# Patient Record
Sex: MALE | Race: WHITE | Employment: FULL TIME | URBAN - METROPOLITAN AREA
[De-identification: names, ages, dates, MRNs, and addresses within clinical notes are randomized per-mention and may not be internally consistent; named-entity substitution may affect disease eponyms.]

---

## 2017-01-24 ENCOUNTER — PATIENT MESSAGE (OUTPATIENT)
Dept: FAMILY MEDICINE CLINIC | Facility: CLINIC | Age: 47
End: 2017-01-24

## 2017-01-24 NOTE — TELEPHONE ENCOUNTER
From: Dom Mann. To: Nikki Frazier DO  Sent: 2017 7:57 AM CST  Subject: Other    Good morning Dr Chito Forde, My son, Melina Lao ( [birth year]/25[birth year]02), developed a groin injury  afternoon during baseball practice.  The area is swollen with some minor b

## 2017-01-26 ENCOUNTER — MED REC SCAN ONLY (OUTPATIENT)
Dept: FAMILY MEDICINE CLINIC | Facility: CLINIC | Age: 47
End: 2017-01-26

## 2017-04-06 DIAGNOSIS — E11.9 TYPE 2 DIABETES MELLITUS WITHOUT COMPLICATION, WITH LONG-TERM CURRENT USE OF INSULIN (HCC): Primary | ICD-10-CM

## 2017-04-06 DIAGNOSIS — Z79.4 TYPE 2 DIABETES MELLITUS WITHOUT COMPLICATION, WITH LONG-TERM CURRENT USE OF INSULIN (HCC): Primary | ICD-10-CM

## 2017-05-18 ENCOUNTER — MED REC SCAN ONLY (OUTPATIENT)
Dept: FAMILY MEDICINE CLINIC | Facility: CLINIC | Age: 47
End: 2017-05-18

## 2017-07-19 ENCOUNTER — MED REC SCAN ONLY (OUTPATIENT)
Dept: FAMILY MEDICINE CLINIC | Facility: CLINIC | Age: 47
End: 2017-07-19

## 2017-09-04 ENCOUNTER — OFFICE VISIT (OUTPATIENT)
Dept: FAMILY MEDICINE CLINIC | Facility: CLINIC | Age: 47
End: 2017-09-04

## 2017-09-04 VITALS
HEIGHT: 69 IN | HEART RATE: 90 BPM | SYSTOLIC BLOOD PRESSURE: 124 MMHG | RESPIRATION RATE: 18 BRPM | DIASTOLIC BLOOD PRESSURE: 78 MMHG | OXYGEN SATURATION: 98 % | BODY MASS INDEX: 43.84 KG/M2 | WEIGHT: 296 LBS | TEMPERATURE: 98 F

## 2017-09-04 DIAGNOSIS — M62.838 TRAPEZIUS MUSCLE SPASM: Primary | ICD-10-CM

## 2017-09-04 DIAGNOSIS — M54.6 ACUTE RIGHT-SIDED THORACIC BACK PAIN: ICD-10-CM

## 2017-09-04 PROCEDURE — 99213 OFFICE O/P EST LOW 20 MIN: CPT | Performed by: NURSE PRACTITIONER

## 2017-09-04 RX ORDER — CYCLOBENZAPRINE HCL 10 MG
10 TABLET ORAL 3 TIMES DAILY
Qty: 30 TABLET | Refills: 1 | Status: SHIPPED | OUTPATIENT
Start: 2017-09-04 | End: 2017-09-24

## 2017-09-04 RX ORDER — PREDNISONE 20 MG/1
TABLET ORAL
Qty: 10 TABLET | Refills: 0 | Status: SHIPPED | OUTPATIENT
Start: 2017-09-04 | End: 2021-02-08 | Stop reason: ALTCHOICE

## 2017-09-04 NOTE — PROGRESS NOTES
HPI:   Deforest Dakins. is a 56year old male who is here for complaints of recurrent right upper back/shoulder pain. Pt states that this began last evening and feels sharp at times and feels muscles spasm.   Pt states that he is at his computer working 98.2 °F (36.8 °C) (Oral)   Resp 18   Ht 69\"   Wt 296 lb   SpO2 98%   BMI 43.71 kg/m²    GENERAL: well developed, well nourished,in no apparent distress  SKIN: no rashes,no suspicious lesions  NECK: supple,no adenopathy,no bruits  LUNGS: clear to auscultat

## 2017-09-04 NOTE — PATIENT INSTRUCTIONS
General Neck and Back Pain    Both neck and back pain are usually caused by injury to the muscles or ligaments of the spine. Sometimes the disks that separate each bone of the spine may cause pain by pressing on a nearby nerve.  Back and neck pain may shabana · Poor conditioning, lack of regular exercise  · Spinal disc disease or arthritis  · Stress  · Pregnancy, or illness like appendicitis, bladder or kidney infection, pelvic infections   Home care  · For neck pain: Use a comfortable pillow that supports the · You may use over-the-counter medicine to control pain, unless another pain medicine was prescribed. If you have chronic conditions like diabetes, liver or kidney disease, stomach ulcers,  gastrointestinal bleeding, or are taking blood thinner medicines. 3. Put your left hand on the right side of your head. Gently pull your head to the left. Hold for 30 to 60 seconds. Use gentle pressure to increase the stretch. Don’t force your head into position. 4. Return your head and neck to the neutral position.   5.

## 2018-01-25 NOTE — TELEPHONE ENCOUNTER
From: Lori Lucas.   To: Ezio Almodovar DO  Sent: 4/6/2017 10:35 AM CDT  Subject: Medication Renewal Request    Original authorizing provider: DO Lori Kim. would like a refill of the following medications:  BD PEN NEEDLE MI
Daily Assessment

## 2018-05-29 LAB — AMB EXT HGBA1C: 6.8 %

## 2018-10-23 ENCOUNTER — PATIENT MESSAGE (OUTPATIENT)
Dept: FAMILY MEDICINE CLINIC | Facility: CLINIC | Age: 48
End: 2018-10-23

## 2018-10-23 NOTE — TELEPHONE ENCOUNTER
From: Ying Collins. To: Lynn Montejo DO  Sent: 10/23/2018 7:40 AM CDT  Subject: Other    Good Morning Dr Flavia Saavedra,    I wanted to send you a message to let you know that I got my flu shot last night, 10/22, from the [facility] at [place] [address].  I

## 2018-10-25 ENCOUNTER — PATIENT MESSAGE (OUTPATIENT)
Dept: FAMILY MEDICINE CLINIC | Facility: CLINIC | Age: 48
End: 2018-10-25

## 2018-10-25 NOTE — TELEPHONE ENCOUNTER
From: Mitch Landau. To: Aida Mcadams DO  Sent: 10/25/2018 8:20 AM CDT  Subject: Other    Good Morning Dr Kamilla Cee,    I wanted to send you a message to let you know that Birdgett Timmons got a flu shot last night, 10/24, from the [facility] at [place] [address].

## 2018-12-19 ENCOUNTER — TELEPHONE (OUTPATIENT)
Dept: FAMILY MEDICINE CLINIC | Facility: CLINIC | Age: 48
End: 2018-12-19

## 2019-03-14 ENCOUNTER — OFFICE VISIT (OUTPATIENT)
Dept: FAMILY MEDICINE CLINIC | Facility: CLINIC | Age: 49
End: 2019-03-14
Payer: COMMERCIAL

## 2019-03-14 VITALS
TEMPERATURE: 102 F | HEIGHT: 69 IN | WEIGHT: 292 LBS | SYSTOLIC BLOOD PRESSURE: 155 MMHG | DIASTOLIC BLOOD PRESSURE: 80 MMHG | OXYGEN SATURATION: 98 % | RESPIRATION RATE: 16 BRPM | BODY MASS INDEX: 43.25 KG/M2 | HEART RATE: 93 BPM

## 2019-03-14 DIAGNOSIS — J11.1 INFLUENZA-LIKE ILLNESS: Primary | ICD-10-CM

## 2019-03-14 PROCEDURE — 99213 OFFICE O/P EST LOW 20 MIN: CPT | Performed by: NURSE PRACTITIONER

## 2019-03-14 RX ORDER — OSELTAMIVIR PHOSPHATE 75 MG/1
75 CAPSULE ORAL 2 TIMES DAILY
Qty: 10 CAPSULE | Refills: 0 | Status: SHIPPED | OUTPATIENT
Start: 2019-03-14 | End: 2019-03-19

## 2019-03-14 NOTE — PATIENT INSTRUCTIONS
The Flu (Influenza)     The virus that causes the flu spreads through the air in droplets when someone who has the flu coughs, sneezes, laughs, or talks. The flu (influenza) is an infection that affects your respiratory tract.  This tract is made up of The flu usually gets better after 7 days or so. In some cases, your healthcare provider may prescribe an antiviral medicine. This may help you get well a little sooner.  For the medicine to help, you need to take it as soon as possible (ideally within 48 ho · One of the best ways to avoid the flu is to get a flu vaccine each year. The virus that causes the flu changes from year to year. For that reason, healthcare providers recommend getting the flu vaccine each year, as soon as it's available in your area.  Bradley Orozco · Rub until the gel is gone and your hands are completely dry. Preventing the flu in healthcare settings  The flu is a special concern for people in hospitals and long-term care facilities.  To help prevent the spread of flu, many hospitals and nursing jean paul

## 2019-03-14 NOTE — PROGRESS NOTES
Patient presents with:  URI: fever,chills,congestion and sinus sx x   :    HPI:   Joy Reyes. is a 51year old male who presents for upper respiratory symptoms for  2  days. Started suddenly. Getting worse.  Feeling feverish,chills headaches, conge Past Surgical History:   Procedure Laterality Date   • MOUTH ODONTECTOMY  5/4/2013    Performed by Cuca Aguilar MD at [facility] MAIN OR   • ORAL SURGERY PROCEDURE        Family History   Problem Relation Age of Onset   • Other (acute MI [Other]) Father Phosphate (TAMIFLU) 75 MG Oral Cap 10 capsule 0     Sig: Take 1 capsule (75 mg total) by mouth 2 (two) times daily for 5 days. Imaging & Consults:  None      Rest, increase fluids,Tylenol prn.   The patient indicates understanding of these issues and

## 2019-06-13 ENCOUNTER — TELEPHONE (OUTPATIENT)
Dept: FAMILY MEDICINE CLINIC | Facility: CLINIC | Age: 49
End: 2019-06-13

## 2020-01-14 LAB
AMB EXT CHOLESTEROL, TOTAL: 117 MG/DL
AMB EXT HDL CHOLESTEROL: 33 MG/DL
AMB EXT LDL CHOLESTEROL, DIRECT: 53 MG/DL
AMB EXT TRIGLYCERIDES: 246 MG/DL
AMB EXT TSH: 3.31 MIU/ML

## 2020-05-29 LAB — AMB EXT HGBA1C: 7.4 %

## 2020-07-02 ENCOUNTER — TELEPHONE (OUTPATIENT)
Dept: FAMILY MEDICINE CLINIC | Facility: CLINIC | Age: 50
End: 2020-07-02

## 2022-01-10 LAB
AMB EXT BUN: 19 MG/DL
AMB EXT CHOL/HDL RATIO: 3.3
AMB EXT CHOLESTEROL, TOTAL: 122 MG/DL
AMB EXT CREATININE, URINE: 164 MG/DL
AMB EXT CREATININE: 0.85 MG/DL
AMB EXT GLUCOSE: 92 MG/DL
AMB EXT HDL CHOLESTEROL: 37 MG/DL
AMB EXT HGBA1C: 5.9 %
AMB EXT LDL CHOLESTEROL, DIRECT: 55 MG/DL
AMB EXT MALB URINE CALC: 2 MG/24HR
AMB EXT MALB/CRE CALC: 9 UG/MG
AMB EXT NON HDL CHOL: 85 MG/DL
AMB EXT TRIGLYCERIDES: 243 MG/DL

## 2022-06-20 ENCOUNTER — OFFICE VISIT (OUTPATIENT)
Dept: FAMILY MEDICINE CLINIC | Facility: CLINIC | Age: 52
End: 2022-06-20
Payer: COMMERCIAL

## 2022-06-20 VITALS
SYSTOLIC BLOOD PRESSURE: 132 MMHG | HEART RATE: 78 BPM | WEIGHT: 275 LBS | TEMPERATURE: 98 F | OXYGEN SATURATION: 97 % | RESPIRATION RATE: 18 BRPM | BODY MASS INDEX: 41 KG/M2 | DIASTOLIC BLOOD PRESSURE: 80 MMHG

## 2022-06-20 DIAGNOSIS — G47.33 OSA (OBSTRUCTIVE SLEEP APNEA): ICD-10-CM

## 2022-06-20 DIAGNOSIS — Z00.00 ANNUAL PHYSICAL EXAM: Primary | ICD-10-CM

## 2022-06-20 DIAGNOSIS — I10 ESSENTIAL HYPERTENSION: ICD-10-CM

## 2022-06-20 DIAGNOSIS — Z12.11 COLON CANCER SCREENING: ICD-10-CM

## 2022-06-20 DIAGNOSIS — D22.9 ATYPICAL NEVI: ICD-10-CM

## 2022-06-20 DIAGNOSIS — E78.00 ELEVATED CHOLESTEROL: ICD-10-CM

## 2022-06-20 DIAGNOSIS — Z79.4 TYPE 2 DIABETES MELLITUS WITHOUT COMPLICATION, WITH LONG-TERM CURRENT USE OF INSULIN (HCC): ICD-10-CM

## 2022-06-20 DIAGNOSIS — E11.9 TYPE 2 DIABETES MELLITUS WITHOUT COMPLICATION, WITH LONG-TERM CURRENT USE OF INSULIN (HCC): ICD-10-CM

## 2022-06-20 PROCEDURE — 99386 PREV VISIT NEW AGE 40-64: CPT | Performed by: FAMILY MEDICINE

## 2022-06-20 PROCEDURE — 90715 TDAP VACCINE 7 YRS/> IM: CPT | Performed by: FAMILY MEDICINE

## 2022-06-20 PROCEDURE — 3079F DIAST BP 80-89 MM HG: CPT | Performed by: FAMILY MEDICINE

## 2022-06-20 PROCEDURE — 90677 PCV20 VACCINE IM: CPT | Performed by: FAMILY MEDICINE

## 2022-06-20 PROCEDURE — 3075F SYST BP GE 130 - 139MM HG: CPT | Performed by: FAMILY MEDICINE

## 2022-06-20 PROCEDURE — 90471 IMMUNIZATION ADMIN: CPT | Performed by: FAMILY MEDICINE

## 2022-06-20 PROCEDURE — 90472 IMMUNIZATION ADMIN EACH ADD: CPT | Performed by: FAMILY MEDICINE

## 2022-06-20 RX ORDER — MELATONIN: COMMUNITY

## 2022-06-20 RX ORDER — LOSARTAN POTASSIUM 100 MG/1
100 TABLET ORAL DAILY
COMMUNITY
Start: 2022-04-12 | End: 2022-06-20

## 2022-06-20 RX ORDER — LOSARTAN POTASSIUM 100 MG/1
100 TABLET ORAL DAILY
Qty: 90 TABLET | Refills: 1 | Status: SHIPPED | OUTPATIENT
Start: 2022-06-20

## 2022-06-20 RX ORDER — SIMVASTATIN 40 MG
40 TABLET ORAL NIGHTLY
Qty: 90 TABLET | Refills: 1 | Status: SHIPPED | OUTPATIENT
Start: 2022-06-20

## 2022-06-20 RX ORDER — SEMAGLUTIDE 1.34 MG/ML
INJECTION, SOLUTION SUBCUTANEOUS
COMMUNITY
Start: 2022-06-02

## 2022-06-20 RX ORDER — HYDROCHLOROTHIAZIDE 25 MG/1
25 TABLET ORAL
Qty: 90 TABLET | Refills: 1 | Status: SHIPPED | OUTPATIENT
Start: 2022-06-20

## 2022-06-20 RX ORDER — ICOSAPENT ETHYL 1000 MG/1
2 CAPSULE ORAL 2 TIMES DAILY
Qty: 360 CAPSULE | Refills: 1 | Status: SHIPPED | OUTPATIENT
Start: 2022-06-20 | End: 2022-07-20

## 2022-06-20 RX ORDER — EMPAGLIFLOZIN 25 MG/1
TABLET, FILM COATED ORAL
COMMUNITY

## 2022-06-20 RX ORDER — HYDROCHLOROTHIAZIDE 25 MG/1
25 TABLET ORAL
COMMUNITY
Start: 2022-04-12 | End: 2022-06-20

## 2022-06-24 LAB
ABSOLUTE BASOPHILS: 74 CELLS/UL (ref 0–200)
ABSOLUTE EOSINOPHILS: 210 CELLS/UL (ref 15–500)
ABSOLUTE LYMPHOCYTES: 1754 CELLS/UL (ref 850–3900)
ABSOLUTE MONOCYTES: 788 CELLS/UL (ref 200–950)
ABSOLUTE NEUTROPHILS: 7676 CELLS/UL (ref 1500–7800)
BASOPHILS: 0.7 %
EOSINOPHILS: 2 %
HEMATOCRIT: 48.3 % (ref 38.5–50)
HEMOGLOBIN: 16 G/DL (ref 13.2–17.1)
LYMPHOCYTES: 16.7 %
MCH: 27.5 PG (ref 27–33)
MCHC: 33.1 G/DL (ref 32–36)
MCV: 83 FL (ref 80–100)
MONOCYTES: 7.5 %
MPV: 10.6 FL (ref 7.5–12.5)
NEUTROPHILS: 73.1 %
PLATELET COUNT: 280 THOUSAND/UL (ref 140–400)
RDW: 13.5 % (ref 11–15)
RED BLOOD CELL COUNT: 5.82 MILLION/UL (ref 4.2–5.8)
T4, FREE: 1.1 NG/DL (ref 0.8–1.8)
TOTAL PSA: 2.7 NG/ML
TSH: 4.42 MIU/L (ref 0.4–4.5)
VITAMIN B12: 575 PG/ML (ref 200–1100)
VITAMIN D, 25-OH, TOTAL: 66 NG/ML (ref 30–100)
WHITE BLOOD CELL COUNT: 10.5 THOUSAND/UL (ref 3.8–10.8)

## 2022-07-22 ENCOUNTER — PATIENT MESSAGE (OUTPATIENT)
Dept: FAMILY MEDICINE CLINIC | Facility: CLINIC | Age: 52
End: 2022-07-22

## 2022-07-25 NOTE — TELEPHONE ENCOUNTER
From: Sheryle Boon. To: Teodoro Brown DO  Sent: 7/22/2022 4:54 PM CDT  Subject: Test results     Good afternoon,    Can you tell me if you have received my Cologuard cancer screening results? I see the result were negative, but I do not see that my records have been updated. I received a letter in the mail stating they have been sent to the office. Also can you tell me if you received my annual eye exam report from [address] [place] [address]? I had my annual exam this past Monday, 7/18.      Thank you,    [employer]  [phone]

## 2022-09-19 ENCOUNTER — TELEPHONE (OUTPATIENT)
Dept: FAMILY MEDICINE CLINIC | Facility: CLINIC | Age: 52
End: 2022-09-19

## 2022-12-08 DIAGNOSIS — E78.00 ELEVATED CHOLESTEROL: ICD-10-CM

## 2022-12-08 DIAGNOSIS — I10 ESSENTIAL HYPERTENSION: ICD-10-CM

## 2022-12-08 DIAGNOSIS — E11.9 TYPE 2 DIABETES MELLITUS WITHOUT COMPLICATION, WITH LONG-TERM CURRENT USE OF INSULIN (HCC): ICD-10-CM

## 2022-12-08 DIAGNOSIS — Z79.4 TYPE 2 DIABETES MELLITUS WITHOUT COMPLICATION, WITH LONG-TERM CURRENT USE OF INSULIN (HCC): ICD-10-CM

## 2022-12-08 RX ORDER — SIMVASTATIN 40 MG
40 TABLET ORAL NIGHTLY
Qty: 90 TABLET | Refills: 0 | Status: SHIPPED | OUTPATIENT
Start: 2022-12-08

## 2022-12-08 RX ORDER — ICOSAPENT ETHYL 1000 MG/1
2 CAPSULE ORAL 2 TIMES DAILY
Qty: 360 CAPSULE | Refills: 0 | Status: SHIPPED | OUTPATIENT
Start: 2022-12-08

## 2022-12-08 RX ORDER — LOSARTAN POTASSIUM 100 MG/1
100 TABLET ORAL DAILY
Qty: 90 TABLET | Refills: 0 | Status: SHIPPED | OUTPATIENT
Start: 2022-12-08

## 2022-12-08 RX ORDER — HYDROCHLOROTHIAZIDE 25 MG/1
25 TABLET ORAL
Qty: 90 TABLET | Refills: 0 | Status: SHIPPED | OUTPATIENT
Start: 2022-12-08

## 2023-02-17 LAB
AMB EXT CREATININE, URINE: 151 MG/DL
AMB EXT HGBA1C: 5.8 %
AMB EXT MALB URINE CALC: 1 MG/24HR
AMB EXT MALB/CRE CALC: 9 UG/MG

## 2023-02-17 PROCEDURE — 3044F HG A1C LEVEL LT 7.0%: CPT | Performed by: FAMILY MEDICINE

## 2023-02-17 PROCEDURE — 3061F NEG MICROALBUMINURIA REV: CPT | Performed by: FAMILY MEDICINE

## 2023-02-27 ENCOUNTER — OFFICE VISIT (OUTPATIENT)
Dept: FAMILY MEDICINE CLINIC | Facility: CLINIC | Age: 53
End: 2023-02-27
Payer: COMMERCIAL

## 2023-02-27 VITALS
HEIGHT: 68 IN | RESPIRATION RATE: 16 BRPM | HEART RATE: 87 BPM | OXYGEN SATURATION: 97 % | DIASTOLIC BLOOD PRESSURE: 86 MMHG | SYSTOLIC BLOOD PRESSURE: 134 MMHG | WEIGHT: 277 LBS | BODY MASS INDEX: 41.98 KG/M2

## 2023-02-27 DIAGNOSIS — I10 ESSENTIAL HYPERTENSION: ICD-10-CM

## 2023-02-27 DIAGNOSIS — E11.9 TYPE 2 DIABETES MELLITUS WITHOUT COMPLICATION, WITH LONG-TERM CURRENT USE OF INSULIN (HCC): ICD-10-CM

## 2023-02-27 DIAGNOSIS — Z00.00 GENERAL MEDICAL EXAM: ICD-10-CM

## 2023-02-27 DIAGNOSIS — E78.00 ELEVATED CHOLESTEROL: ICD-10-CM

## 2023-02-27 DIAGNOSIS — E56.9 VITAMIN DEFICIENCY: Primary | ICD-10-CM

## 2023-02-27 DIAGNOSIS — Z79.4 TYPE 2 DIABETES MELLITUS WITHOUT COMPLICATION, WITH LONG-TERM CURRENT USE OF INSULIN (HCC): ICD-10-CM

## 2023-02-27 PROCEDURE — 3008F BODY MASS INDEX DOCD: CPT | Performed by: FAMILY MEDICINE

## 2023-02-27 PROCEDURE — 99214 OFFICE O/P EST MOD 30 MIN: CPT | Performed by: FAMILY MEDICINE

## 2023-02-27 PROCEDURE — 3079F DIAST BP 80-89 MM HG: CPT | Performed by: FAMILY MEDICINE

## 2023-02-27 PROCEDURE — 3075F SYST BP GE 130 - 139MM HG: CPT | Performed by: FAMILY MEDICINE

## 2023-02-27 RX ORDER — HYDROCHLOROTHIAZIDE 25 MG/1
25 TABLET ORAL
Qty: 90 TABLET | Refills: 1 | Status: SHIPPED | OUTPATIENT
Start: 2023-02-27

## 2023-02-27 RX ORDER — LOSARTAN POTASSIUM 100 MG/1
100 TABLET ORAL DAILY
Qty: 90 TABLET | Refills: 1 | Status: SHIPPED | OUTPATIENT
Start: 2023-02-27

## 2023-02-27 RX ORDER — SIMVASTATIN 40 MG
40 TABLET ORAL NIGHTLY
Qty: 90 TABLET | Refills: 1 | Status: SHIPPED | OUTPATIENT
Start: 2023-02-27

## 2023-02-27 RX ORDER — ICOSAPENT ETHYL 1000 MG/1
2 CAPSULE ORAL 2 TIMES DAILY
Qty: 360 CAPSULE | Refills: 1 | Status: SHIPPED | OUTPATIENT
Start: 2023-02-27

## 2023-04-21 ENCOUNTER — PATIENT MESSAGE (OUTPATIENT)
Dept: FAMILY MEDICINE CLINIC | Facility: CLINIC | Age: 53
End: 2023-04-21

## 2023-04-21 NOTE — TELEPHONE ENCOUNTER
From: Ana Ahuja. To: Samuel Varghese DO  Sent: 4/21/2023 10:50 AM CDT  Subject: Shingles Vaccine    Good Morning,    Can you please update my medical records to show that I have received my second dose of the Shingles vaccine, today, 4/21 at [facility] [address] [facility].       Vaccine: Shingrix  Vol: 0.50  site: left deltoid    Thank you,    Carmen Christensen

## 2023-06-24 ENCOUNTER — HOSPITAL ENCOUNTER (OUTPATIENT)
Dept: CT IMAGING | Age: 53
Discharge: HOME | End: 2023-06-24
Attending: FAMILY MEDICINE

## 2023-06-24 DIAGNOSIS — R93.1 ABNORMAL CT SCAN OF HEART: ICD-10-CM

## 2023-07-05 ENCOUNTER — PATIENT MESSAGE (OUTPATIENT)
Dept: FAMILY MEDICINE CLINIC | Facility: CLINIC | Age: 53
End: 2023-07-05

## 2023-07-05 ENCOUNTER — OFFICE VISIT (OUTPATIENT)
Dept: FAMILY MEDICINE CLINIC | Facility: CLINIC | Age: 53
End: 2023-07-05
Payer: COMMERCIAL

## 2023-07-05 VITALS
SYSTOLIC BLOOD PRESSURE: 132 MMHG | BODY MASS INDEX: 41.98 KG/M2 | WEIGHT: 277 LBS | HEART RATE: 72 BPM | DIASTOLIC BLOOD PRESSURE: 86 MMHG | RESPIRATION RATE: 16 BRPM | OXYGEN SATURATION: 95 % | HEIGHT: 68 IN

## 2023-07-05 DIAGNOSIS — Z82.49 FAMILY HISTORY OF EARLY CAD: ICD-10-CM

## 2023-07-05 DIAGNOSIS — I10 ESSENTIAL HYPERTENSION: ICD-10-CM

## 2023-07-05 DIAGNOSIS — E78.00 ELEVATED CHOLESTEROL: ICD-10-CM

## 2023-07-05 DIAGNOSIS — G47.33 OSA (OBSTRUCTIVE SLEEP APNEA): ICD-10-CM

## 2023-07-05 DIAGNOSIS — R93.1 ELEVATED CORONARY ARTERY CALCIUM SCORE: Primary | ICD-10-CM

## 2023-07-05 DIAGNOSIS — E11.9 TYPE 2 DIABETES MELLITUS WITHOUT COMPLICATION, WITH LONG-TERM CURRENT USE OF INSULIN (HCC): ICD-10-CM

## 2023-07-05 DIAGNOSIS — Z79.4 TYPE 2 DIABETES MELLITUS WITHOUT COMPLICATION, WITH LONG-TERM CURRENT USE OF INSULIN (HCC): ICD-10-CM

## 2023-07-05 PROCEDURE — 3008F BODY MASS INDEX DOCD: CPT | Performed by: FAMILY MEDICINE

## 2023-07-05 PROCEDURE — 3075F SYST BP GE 130 - 139MM HG: CPT | Performed by: FAMILY MEDICINE

## 2023-07-05 PROCEDURE — 3079F DIAST BP 80-89 MM HG: CPT | Performed by: FAMILY MEDICINE

## 2023-07-05 PROCEDURE — 99214 OFFICE O/P EST MOD 30 MIN: CPT | Performed by: FAMILY MEDICINE

## 2023-07-06 ENCOUNTER — PATIENT MESSAGE (OUTPATIENT)
Dept: FAMILY MEDICINE CLINIC | Facility: CLINIC | Age: 53
End: 2023-07-06

## 2023-07-06 NOTE — TELEPHONE ENCOUNTER
From: Kaila Sands. To: Glenn Epley, DO  Sent: 7/6/2023 2:25 PM CDT  Subject: Dr Bang Heath test    Hi Dr Pardeep Severino,    I tried to call and make an appointment with Dr Monica Byrd at [facility] Cardiology, but he is completely booked through the rest of the year. Is there another doctor at [facility] Cardiology that you recommend? Also. ..do you need to update the referral if I see a different doctor or is it based on the practice? I was also wondering about the Nuclear stress test we talked about? I forgot what we discussed regarding the scheduling of it. Do I schedule it and ask them to work with insurance to see if it is covered or will your office do that? I don't want to confuse the matter if someone from your office will. I seem to recall you saying not to do anything until I hear from your office.     Thanks,    Mihir Payne

## 2023-07-06 NOTE — TELEPHONE ENCOUNTER
From: Manan Frances. To: Gaston Ayala,   Sent: 7/5/2023 7:11 PM CDT  Subject: EKG? Hi Dr Corado,    I have a quick question regarding the EKG referral. Is it necessary to schedule a separate EKG test given I will be hooked up to an EKG during the stress test you requested? I'm assuming they will establish a baseline prior to the test and monitor while the test is performed? Just curious.     Thank you,    [employer]  [phone]

## 2023-07-07 ENCOUNTER — EKG ENCOUNTER (OUTPATIENT)
Dept: LAB | Age: 53
End: 2023-07-07
Attending: FAMILY MEDICINE
Payer: COMMERCIAL

## 2023-07-07 DIAGNOSIS — Z79.4 TYPE 2 DIABETES MELLITUS WITHOUT COMPLICATION, WITH LONG-TERM CURRENT USE OF INSULIN (HCC): ICD-10-CM

## 2023-07-07 DIAGNOSIS — E78.00 ELEVATED CHOLESTEROL: ICD-10-CM

## 2023-07-07 DIAGNOSIS — G47.33 OSA (OBSTRUCTIVE SLEEP APNEA): ICD-10-CM

## 2023-07-07 DIAGNOSIS — E11.9 TYPE 2 DIABETES MELLITUS WITHOUT COMPLICATION, WITH LONG-TERM CURRENT USE OF INSULIN (HCC): ICD-10-CM

## 2023-07-07 DIAGNOSIS — I10 ESSENTIAL HYPERTENSION: ICD-10-CM

## 2023-07-07 DIAGNOSIS — R93.1 ELEVATED CORONARY ARTERY CALCIUM SCORE: ICD-10-CM

## 2023-07-07 DIAGNOSIS — Z82.49 FAMILY HISTORY OF EARLY CAD: ICD-10-CM

## 2023-07-07 PROCEDURE — 93010 ELECTROCARDIOGRAM REPORT: CPT | Performed by: INTERNAL MEDICINE

## 2023-07-07 PROCEDURE — 93005 ELECTROCARDIOGRAM TRACING: CPT

## 2023-07-08 LAB
ATRIAL RATE: 76 BPM
P AXIS: 36 DEGREES
P-R INTERVAL: 154 MS
Q-T INTERVAL: 396 MS
QRS DURATION: 102 MS
QTC CALCULATION (BEZET): 445 MS
R AXIS: 25 DEGREES
T AXIS: 42 DEGREES
VENTRICULAR RATE: 76 BPM

## 2023-07-10 ENCOUNTER — TELEPHONE (OUTPATIENT)
Dept: ADMINISTRATIVE | Age: 53
End: 2023-07-10

## 2023-07-10 DIAGNOSIS — Z79.4 TYPE 2 DIABETES MELLITUS WITHOUT COMPLICATION, WITH LONG-TERM CURRENT USE OF INSULIN (HCC): ICD-10-CM

## 2023-07-10 DIAGNOSIS — E78.9 SERUM CHOLESTEROL ELEVATED: ICD-10-CM

## 2023-07-10 DIAGNOSIS — E11.9 TYPE 2 DIABETES MELLITUS WITHOUT COMPLICATION, WITH LONG-TERM CURRENT USE OF INSULIN (HCC): ICD-10-CM

## 2023-07-10 DIAGNOSIS — G47.33 OSA (OBSTRUCTIVE SLEEP APNEA): ICD-10-CM

## 2023-07-10 DIAGNOSIS — Z82.49 FAMILY HISTORY OF EARLY CAD: ICD-10-CM

## 2023-07-10 DIAGNOSIS — I10 PRIMARY HYPERTENSION: ICD-10-CM

## 2023-07-10 DIAGNOSIS — R93.1 ELEVATED CORONARY ARTERY CALCIUM SCORE: Primary | ICD-10-CM

## 2023-07-10 NOTE — TELEPHONE ENCOUNTER
Status Of Auth is PENDING  Attempted to reach out to patient by phone and/or Mychart. Insurance will not cover without approval.  Patient should reschedule. [facility]  [facility] to [facility]    CARD NUC STRESS TEST LEXISCAN ([id number]/)     Referral # [id number]     Scheduled For: 07/11/23    Request Status: Pending Authorization     Case Number: [id number]          Clinical notes sent for review. Patient notified of pending status via [address] [place] [address] [address].      Appt Desk > Noted

## 2023-07-14 ENCOUNTER — PATIENT MESSAGE (OUTPATIENT)
Dept: FAMILY MEDICINE CLINIC | Facility: CLINIC | Age: 53
End: 2023-07-14

## 2023-07-14 NOTE — TELEPHONE ENCOUNTER
From: Anais Teague. To: Andrew Paul DO  Sent: 7/14/2023 9:57 AM CDT  Subject: Kristopherida Fruits Nuclear Heart Scan    Good Morning,    I am having trouble with my health plan making a decision regarding the cardiac nuclear heart scan that Dr Gilberto Vega ordered. I have now had to schedule it 2 times while I wait for their \"approval\". Central scheduling said the primary ordering doctor can sometime contact them and push it through? Would you be able to do this? Alternatively, I still need to schedule a consultation with [address] [place] [address] Cardiology. Perhaps I go in to see them and see if it is needed or have them order it? I don't know how to proceed? Please advise.     Chrissy Jiménez  [phone]

## 2023-07-17 ENCOUNTER — PATIENT MESSAGE (OUTPATIENT)
Dept: FAMILY MEDICINE CLINIC | Facility: CLINIC | Age: 53
End: 2023-07-17

## 2023-07-17 NOTE — TELEPHONE ENCOUNTER
From: Anna Carney. To: Juwan Tirado DO  Sent: 7/17/2023 8:24 AM CDT  Subject: Isabel Blanco,    I just received a message from [facility] referral dept. saying my health plan has denied the request for a Lexiscan. They are supposed to be contacting you also. I'm not sure how to proceed.  Should I go ahead and schedule a consultation with [address] [place] [address] Cardiology or do you want to try and schedule a treadmill stress test?    She Pettit  [phone]

## 2023-07-17 NOTE — TELEPHONE ENCOUNTER
Status Of Delfino Rod is DENIED. Attempted to reach out to patient by phone and/or Mychart. Insurance will not cover without approval.  PATIENT CANNOT PROCEED. Please have patient reach out to provider for next steps. Message routed to clinical staff:  Refer to attached determination letter for denial rational and appeal process. CARD NUC STRESS TEST LEXISCAN ([id number]/)   Exam scheduled for 7/24/23    Referral #: [id number]     Status: DENIED    The reason is, Your doctor is checking you for heart disease. Your doctor ordered a special heart test. This test measures blood flow to the heart. This test should be used when you have heart disease symptoms, and you are at moderate or high risk for heart disease. Your risk is based on various factors such as age, gender and nature of your symptoms. We reviewed the notes we received. The notes show that you do not have heart disease symptoms. So, we cannot approve this request as medically necessary. We used [facility] [employer] Benefits Management Clinical Guideline titled Imaging of the Heart, Myocardial Perfusion Imaging to make this decision. You may view this guideline at [email or web address]. Reference number [id number]     Notified clinical staff for follow-up, a copy of the denial letter is filed under the MEDIA tab. Patient notified of adverse determination via [facility].

## 2023-07-24 ENCOUNTER — HOSPITAL ENCOUNTER (OUTPATIENT)
Dept: CV DIAGNOSTICS | Facility: HOSPITAL | Age: 53
Discharge: HOME | End: 2023-07-24
Attending: FAMILY MEDICINE
Payer: COMMERCIAL

## 2023-07-24 ENCOUNTER — APPOINTMENT (OUTPATIENT)
Dept: CV DIAGNOSTICS | Facility: HOSPITAL | Age: 53
End: 2023-07-24
Attending: FAMILY MEDICINE
Payer: COMMERCIAL

## 2023-07-24 DIAGNOSIS — Z82.49 FAMILY HISTORY OF EARLY CAD: ICD-10-CM

## 2023-07-24 DIAGNOSIS — E78.9 SERUM CHOLESTEROL ELEVATED: ICD-10-CM

## 2023-07-24 DIAGNOSIS — Z79.4 TYPE 2 DIABETES MELLITUS WITHOUT COMPLICATION, WITH LONG-TERM CURRENT USE OF INSULIN (HCC): ICD-10-CM

## 2023-07-24 DIAGNOSIS — E11.9 TYPE 2 DIABETES MELLITUS WITHOUT COMPLICATION, WITH LONG-TERM CURRENT USE OF INSULIN (HCC): ICD-10-CM

## 2023-07-24 DIAGNOSIS — G47.33 OSA (OBSTRUCTIVE SLEEP APNEA): ICD-10-CM

## 2023-07-24 DIAGNOSIS — R93.1 ELEVATED CORONARY ARTERY CALCIUM SCORE: ICD-10-CM

## 2023-07-24 DIAGNOSIS — I10 PRIMARY HYPERTENSION: ICD-10-CM

## 2023-07-24 PROCEDURE — 93350 STRESS TTE ONLY: CPT | Performed by: FAMILY MEDICINE

## 2023-07-24 PROCEDURE — 93017 CV STRESS TEST TRACING ONLY: CPT | Performed by: FAMILY MEDICINE

## 2023-07-24 PROCEDURE — 93018 CV STRESS TEST I&R ONLY: CPT | Performed by: FAMILY MEDICINE

## 2023-08-29 LAB — AMB EXT HGBA1C: 6 %

## 2023-08-29 PROCEDURE — 3044F HG A1C LEVEL LT 7.0%: CPT | Performed by: FAMILY MEDICINE

## 2023-09-02 DIAGNOSIS — I10 ESSENTIAL HYPERTENSION: ICD-10-CM

## 2023-09-02 DIAGNOSIS — Z79.4 TYPE 2 DIABETES MELLITUS WITHOUT COMPLICATION, WITH LONG-TERM CURRENT USE OF INSULIN (HCC): ICD-10-CM

## 2023-09-02 DIAGNOSIS — E11.9 TYPE 2 DIABETES MELLITUS WITHOUT COMPLICATION, WITH LONG-TERM CURRENT USE OF INSULIN (HCC): ICD-10-CM

## 2023-09-05 LAB
ABSOLUTE BASOPHILS: 48 CELLS/UL (ref 0–200)
ABSOLUTE EOSINOPHILS: 86 CELLS/UL (ref 15–500)
ABSOLUTE LYMPHOCYTES: 2458 CELLS/UL (ref 850–3900)
ABSOLUTE MONOCYTES: 528 CELLS/UL (ref 200–950)
ABSOLUTE NEUTROPHILS: 6480 CELLS/UL (ref 1500–7800)
ALBUMIN/GLOBULIN RATIO: 1.5 (CALC) (ref 1–2.5)
ALBUMIN: 4.4 G/DL (ref 3.6–5.1)
ALKALINE PHOSPHATASE: 72 U/L (ref 35–144)
ALT: 20 U/L (ref 9–46)
AST: 16 U/L (ref 10–35)
BASOPHILS: 0.5 %
BILIRUBIN, TOTAL: 0.6 MG/DL (ref 0.2–1.2)
BUN: 21 MG/DL (ref 7–25)
CALCIUM: 9.8 MG/DL (ref 8.6–10.3)
CARBON DIOXIDE: 27 MMOL/L (ref 20–32)
CHLORIDE: 103 MMOL/L (ref 98–110)
CHOL/HDLC RATIO: 3 (CALC)
CHOLESTEROL, TOTAL: 116 MG/DL
CREATININE: 0.85 MG/DL (ref 0.7–1.3)
EGFR: 105 ML/MIN/1.73M2
EOSINOPHILS: 0.9 %
GLOBULIN: 2.9 G/DL (CALC) (ref 1.9–3.7)
GLUCOSE: 89 MG/DL (ref 65–99)
HDL CHOLESTEROL: 39 MG/DL
HEMATOCRIT: 49.2 % (ref 38.5–50)
HEMOGLOBIN: 16.2 G/DL (ref 13.2–17.1)
LDL-CHOLESTEROL: 56 MG/DL (CALC)
LYMPHOCYTES: 25.6 %
MCH: 27 PG (ref 27–33)
MCHC: 32.9 G/DL (ref 32–36)
MCV: 82 FL (ref 80–100)
MONOCYTES: 5.5 %
MPV: 10.2 FL (ref 7.5–12.5)
NEUTROPHILS: 67.5 %
NON-HDL CHOLESTEROL: 77 MG/DL (CALC)
PLATELET COUNT: 266 THOUSAND/UL (ref 140–400)
POTASSIUM: 3.9 MMOL/L (ref 3.5–5.3)
PROTEIN, TOTAL: 7.3 G/DL (ref 6.1–8.1)
RDW: 12.9 % (ref 11–15)
RED BLOOD CELL COUNT: 6 MILLION/UL (ref 4.2–5.8)
SODIUM: 141 MMOL/L (ref 135–146)
TOTAL PSA: 2.5 NG/ML
TRIGLYCERIDES: 128 MG/DL
VITAMIN B12: 929 PG/ML (ref 200–1100)
VITAMIN D, 25-OH, TOTAL: 54 NG/ML (ref 30–100)
WHITE BLOOD CELL COUNT: 9.6 THOUSAND/UL (ref 3.8–10.8)

## 2023-09-05 RX ORDER — HYDROCHLOROTHIAZIDE 25 MG/1
25 TABLET ORAL
Qty: 90 TABLET | Refills: 1 | Status: SHIPPED | OUTPATIENT
Start: 2023-09-05

## 2023-09-07 ENCOUNTER — TELEPHONE (OUTPATIENT)
Dept: FAMILY MEDICINE CLINIC | Facility: CLINIC | Age: 53
End: 2023-09-07

## 2023-09-16 DIAGNOSIS — E78.00 ELEVATED CHOLESTEROL: ICD-10-CM

## 2023-09-18 RX ORDER — SIMVASTATIN 40 MG
40 TABLET ORAL NIGHTLY
Qty: 90 TABLET | Refills: 1 | Status: SHIPPED | OUTPATIENT
Start: 2023-09-18

## 2023-10-01 ENCOUNTER — PATIENT MESSAGE (OUTPATIENT)
Dept: FAMILY MEDICINE CLINIC | Facility: CLINIC | Age: 53
End: 2023-10-01

## 2023-10-03 NOTE — TELEPHONE ENCOUNTER
From: Rolanda Mirza. To: William Holly  Sent: 10/1/2023 3:40 PM CDT  Subject: Vaccination update    Good Afternoon,    Can you please update my records to reflect that I got the FLU vaccine and the [address] booster on 10/1 at [facility].     Flucelvax Quad 23-24 Inj Seqi  OF#[id number]  qty 0.5  administered in right shoulder    Spikevax 50 MCG/0.5ML INJ Mode  RX# [id number]  qty 0.5  administered in left shoulder    Thank you,    Lc Benavidez

## 2023-10-11 DIAGNOSIS — I10 ESSENTIAL HYPERTENSION: ICD-10-CM

## 2023-10-11 RX ORDER — LOSARTAN POTASSIUM 100 MG/1
100 TABLET ORAL DAILY
Qty: 90 TABLET | Refills: 1 | Status: SHIPPED | OUTPATIENT
Start: 2023-10-11

## 2023-11-30 ENCOUNTER — OFFICE VISIT (OUTPATIENT)
Dept: FAMILY MEDICINE CLINIC | Facility: CLINIC | Age: 53
End: 2023-11-30
Payer: COMMERCIAL

## 2023-11-30 VITALS
DIASTOLIC BLOOD PRESSURE: 80 MMHG | RESPIRATION RATE: 17 BRPM | SYSTOLIC BLOOD PRESSURE: 132 MMHG | HEIGHT: 68 IN | BODY MASS INDEX: 41.28 KG/M2 | HEART RATE: 90 BPM | OXYGEN SATURATION: 97 % | WEIGHT: 272.38 LBS

## 2023-11-30 DIAGNOSIS — E11.319 DIABETIC RETINOPATHY OF BOTH EYES WITHOUT MACULAR EDEMA ASSOCIATED WITH TYPE 2 DIABETES MELLITUS, UNSPECIFIED RETINOPATHY SEVERITY (HCC): ICD-10-CM

## 2023-11-30 DIAGNOSIS — I10 ESSENTIAL HYPERTENSION: ICD-10-CM

## 2023-11-30 DIAGNOSIS — E11.9 TYPE 2 DIABETES MELLITUS WITHOUT COMPLICATION, WITH LONG-TERM CURRENT USE OF INSULIN (HCC): ICD-10-CM

## 2023-11-30 DIAGNOSIS — Z79.4 TYPE 2 DIABETES MELLITUS WITHOUT COMPLICATION, WITH LONG-TERM CURRENT USE OF INSULIN (HCC): ICD-10-CM

## 2023-11-30 DIAGNOSIS — Z00.00 ANNUAL PHYSICAL EXAM: Primary | ICD-10-CM

## 2023-11-30 DIAGNOSIS — E78.00 ELEVATED CHOLESTEROL: ICD-10-CM

## 2023-11-30 PROCEDURE — 99396 PREV VISIT EST AGE 40-64: CPT | Performed by: FAMILY MEDICINE

## 2023-11-30 PROCEDURE — 3008F BODY MASS INDEX DOCD: CPT | Performed by: FAMILY MEDICINE

## 2023-11-30 PROCEDURE — 3079F DIAST BP 80-89 MM HG: CPT | Performed by: FAMILY MEDICINE

## 2023-11-30 PROCEDURE — 3075F SYST BP GE 130 - 139MM HG: CPT | Performed by: FAMILY MEDICINE

## 2023-11-30 RX ORDER — INSULIN DETEMIR 100 [IU]/ML
INJECTION, SOLUTION SUBCUTANEOUS
COMMUNITY

## 2023-11-30 RX ORDER — LOSARTAN POTASSIUM 100 MG/1
100 TABLET ORAL DAILY
Qty: 90 TABLET | Refills: 1 | Status: SHIPPED | OUTPATIENT
Start: 2023-11-30

## 2023-11-30 RX ORDER — PEN NEEDLE, DIABETIC 32GX 5/32"
1 NEEDLE, DISPOSABLE MISCELLANEOUS DAILY
COMMUNITY
Start: 2023-09-16

## 2023-11-30 RX ORDER — SIMVASTATIN 40 MG
40 TABLET ORAL NIGHTLY
Qty: 90 TABLET | Refills: 1 | Status: SHIPPED | OUTPATIENT
Start: 2023-11-30

## 2023-11-30 RX ORDER — ICOSAPENT ETHYL 1000 MG/1
2 CAPSULE ORAL 2 TIMES DAILY
Qty: 360 CAPSULE | Refills: 1 | Status: SHIPPED | OUTPATIENT
Start: 2023-11-30

## 2023-11-30 RX ORDER — HYDROCHLOROTHIAZIDE 25 MG/1
25 TABLET ORAL
Qty: 90 TABLET | Refills: 1 | Status: SHIPPED | OUTPATIENT
Start: 2023-11-30

## 2023-12-11 ENCOUNTER — TELEPHONE (OUTPATIENT)
Dept: FAMILY MEDICINE CLINIC | Facility: CLINIC | Age: 53
End: 2023-12-11

## 2024-02-29 DIAGNOSIS — Z79.4 TYPE 2 DIABETES MELLITUS WITHOUT COMPLICATION, WITH LONG-TERM CURRENT USE OF INSULIN (HCC): ICD-10-CM

## 2024-02-29 DIAGNOSIS — E11.9 TYPE 2 DIABETES MELLITUS WITHOUT COMPLICATION, WITH LONG-TERM CURRENT USE OF INSULIN (HCC): ICD-10-CM

## 2024-03-08 LAB — AMB EXT HGBA1C: 6.1 %

## 2024-03-12 ENCOUNTER — TELEPHONE (OUTPATIENT)
Dept: FAMILY MEDICINE CLINIC | Facility: CLINIC | Age: 54
End: 2024-03-12

## 2024-03-21 LAB
AMB EXT CREATININE, URINE: 150 MG/DL
AMB EXT MALB URINE CALC: 2 MG/24HR
AMB EXT MALB/CRE CALC: 12 UG/MG

## 2024-05-25 DIAGNOSIS — Z79.4 TYPE 2 DIABETES MELLITUS WITHOUT COMPLICATION, WITH LONG-TERM CURRENT USE OF INSULIN (HCC): ICD-10-CM

## 2024-05-25 DIAGNOSIS — I10 ESSENTIAL HYPERTENSION: ICD-10-CM

## 2024-05-25 DIAGNOSIS — E11.9 TYPE 2 DIABETES MELLITUS WITHOUT COMPLICATION, WITH LONG-TERM CURRENT USE OF INSULIN (HCC): ICD-10-CM

## 2024-05-25 DIAGNOSIS — E78.00 ELEVATED CHOLESTEROL: ICD-10-CM

## 2024-05-29 RX ORDER — SIMVASTATIN 40 MG
40 TABLET ORAL NIGHTLY
Qty: 90 TABLET | Refills: 1 | Status: SHIPPED | OUTPATIENT
Start: 2024-05-29

## 2024-05-29 RX ORDER — LOSARTAN POTASSIUM 100 MG/1
100 TABLET ORAL DAILY
Qty: 90 TABLET | Refills: 1 | Status: SHIPPED | OUTPATIENT
Start: 2024-05-29

## 2024-05-29 RX ORDER — ICOSAPENT ETHYL 1000 MG/1
2 CAPSULE ORAL 2 TIMES DAILY
Qty: 360 CAPSULE | Refills: 1 | Status: SHIPPED | OUTPATIENT
Start: 2024-05-29

## 2024-06-03 NOTE — PROGRESS NOTES
HPI:   Sherwin Regalado Jr. is a 54 year old male who presents for f/u on DM and CAD     Pt is still seeing endo   Discussed labs   + family h/o CAD - parents, M uncle, MGM  Glucose 110's    Normal stress test   Patient denies chest pain, shortness of breath, dizziness, and lightheadedness. No exertional symptoms.  Working out and eating healthy       PROCEDURE:  CT CALCIUM SCORING      COMPARISON:  None.      INDICATIONS:  R93.1 Abnormal CT scan of heart      TECHNIQUE:  The patient was placed in the supine position on the multidector CT table at [facility].  EKG Gated was used to minimize cardiac motion. Non contrast 2mm axial cross sectional images were obtained in a narrow field of view to display   heart. Dose reduction techniques were used. Dose information is transmitted to the ACR (American College of Radiology) NRDR (National Radiology Data Registry) which includes the Dose Index Registry. See the Radiologist's over-read for evaluation of   non-cardiac and non-vascular structures.      FINDINGS:    CALCIUM SCORING RESULTS (Volume / Agatston):   LEFT MAIN:    0.00 / 0.00   RCA:      0.00 / 0.00   LAD:      66.52 / 77.96   CIRCUMFLEX:  0.00 / 0.00   TOTAL:    66.52 / 77.96      Your Coronary Artery Calcium Score: 77.96        Sherwin has been checking his feet on a regular basis.   Sherwin denies any tingling of the feet.    ANGELA - using cpap     Current Outpatient Medications   Medication Sig Dispense Refill    OZEMPIC, 2 MG/DOSE, 8 MG/3ML Subcutaneous Solution Pen-injector Inject 2 mg into the skin once a week.      aspirin (ASPIRIN ADULT LOW DOSE) 81 MG Oral Tab EC       SIMVASTATIN 40 MG Oral Tab TAKE 1 TABLET BY MOUTH EVERY DAY AT NIGHT 90 tablet 1    LOSARTAN 100 MG Oral Tab TAKE 1 TABLET BY MOUTH EVERY DAY 90 tablet 1    VASCEPA 1 g Oral Cap TAKE 2 CAPSULES BY MOUTH 2 TIMES DAILY. 360 capsule 1    METFORMIN HCL 1000 MG Oral Tab TAKE 1 TABLET BY MOUTH TWICE A DAY WITH FOOD 180 tablet 1     Cholecalciferol (VITAMIN D3) 25 MCG (1000 UT) Oral Cap       insulin detemir (LEVEMIR) 100 UNIT/ML Subcutaneous Solution       semaglutide 2 MG/1.5ML Subcutaneous Solution Pen-injector       BD PEN NEEDLE RAUL 2ND GEN 32G X 4 MM Does not apply Misc 1 strip by In Vitro route daily.      hydroCHLOROthiazide 25 MG Oral Tab Take 1 tablet (25 mg total) by mouth daily with food. 90 tablet 1    Empagliflozin (JARDIANCE) 25 MG Oral Tab       cyanocobalamine 1000 MCG Oral Tab       Insulin Pen Needle (BD PEN NEEDLE MINI U/F) 31G X 5 MM Does not apply Misc Nightly insulin administration 100 each 1    Multiple Vitamin (MULTI-VITAMIN OR) Take  by mouth.        Past Medical History:    HIGH CHOLESTEROL    Lipid screening    Sleep apnea    Type II or unspecified type diabetes mellitus without mention of complication, not stated as uncontrolled    Unspecified essential hypertension      Past Surgical History:   Procedure Laterality Date    Oral surgery procedure        Social History:   Social History     Socioeconomic History    Marital status:    Tobacco Use    Smoking status: Never    Smokeless tobacco: Never   Substance and Sexual Activity    Alcohol use: Yes     Comment: rare/social    Drug use: No   Other Topics Concern    Caffeine Concern Yes     Comment: 2 qd    Exercise No     Social Determinants of Health      Received from [facility], [facility]    Social Connections    Received from [facility], [facility]    Housing Stability     Exercise: walking/ minimal   Diet: better      REVIEW OF SYSTEMS:   GENERAL: feels well otherwise  SKIN: denies any unusual skin lesions or rashes  PULMONARY: denies cough or shortness of breath  CV: denies chest pain or palpitations  GI: denies abdominal pain, heartburn, chronic diarrhea or constipation  NEURO: denies headaches or dizziness  PSYCH: denies depression or anxiety or anhednia  NUTRITION: trying  to follow diabetic diet    EXAM:   /78   Pulse 89   Resp 20   Ht 5' 8\" (1.727 m)   Wt 270 lb (122.5 kg)   SpO2 98%   BMI 41.05 kg/m²    Body mass index is 41.05 kg/m².  GENERAL: well developed, well nourished, in no apparent distress  SKIN: no rashes,no suspicious lesions  NECK: supple,no adenopathy, no JVD, no carotid bruits   LUNGS: clear to auscultation, easy breathing, no cough  CV: normal S1 S2, RRR without murmur  GI: good BS's, no masses, no HSM or tenderness  EXT: no cyanosis, clubbing or edema, bilateral foot exam unremarkable for open skin or lesions, bilateral 2+ DP, normal visual inspection bilaterally  NEURO: sensation is intact to monofilament bilaterally   PSYCH: judgement and insight are appropriate & intact    ASSESSMENT AND PLAN:   Sherwin Regalado Jr. is a 54 year old male who presents for a recheck of his diabetes.   Discussed importance of medication, diet adherence, skin care and routine exercise.   Reviewed good diabetic foot care.   Annual dilated eye exams reinforced. Routine accuchecks and diabetic labwork as discussed.  The patient indicates understanding of these issues and agrees to the plan.  Discussed diet and exercise at length    1. Type 2 diabetes mellitus without complication, with long-term current use of insulin (HCC)    - Cardio Referral - Internal    2. Diabetic retinopathy of both eyes without macular edema associated with type 2 diabetes mellitus, unspecified retinopathy severity (HCC)    - Cardio Referral - Internal    3. Elevated cholesterol    - Cardio Referral - Internal    4. Essential hypertension    - Cardio Referral - Internal    5. Family history of early CAD    - Cardio Referral - Internal    6. Elevated coronary artery calcium score    - Cardio Referral - Internal    7. ANGELA (obstructive sleep apnea)    - Cardio Referral - Internal    Rn visit for bp   We had a lengthy discussion about lifestyle and its huge importance  Follow up 6 mo or sooner if  needed

## 2024-06-06 ENCOUNTER — OFFICE VISIT (OUTPATIENT)
Dept: FAMILY MEDICINE CLINIC | Facility: CLINIC | Age: 54
End: 2024-06-06
Payer: COMMERCIAL

## 2024-06-06 VITALS
WEIGHT: 270 LBS | BODY MASS INDEX: 40.92 KG/M2 | HEIGHT: 68 IN | OXYGEN SATURATION: 98 % | HEART RATE: 89 BPM | DIASTOLIC BLOOD PRESSURE: 80 MMHG | RESPIRATION RATE: 20 BRPM | SYSTOLIC BLOOD PRESSURE: 136 MMHG

## 2024-06-06 DIAGNOSIS — E78.00 ELEVATED CHOLESTEROL: ICD-10-CM

## 2024-06-06 DIAGNOSIS — I10 ESSENTIAL HYPERTENSION: Primary | ICD-10-CM

## 2024-06-06 DIAGNOSIS — E11.9 TYPE 2 DIABETES MELLITUS WITHOUT COMPLICATION, WITH LONG-TERM CURRENT USE OF INSULIN (HCC): ICD-10-CM

## 2024-06-06 DIAGNOSIS — R93.1 ELEVATED CORONARY ARTERY CALCIUM SCORE: ICD-10-CM

## 2024-06-06 DIAGNOSIS — Z79.4 TYPE 2 DIABETES MELLITUS WITHOUT COMPLICATION, WITH LONG-TERM CURRENT USE OF INSULIN (HCC): ICD-10-CM

## 2024-06-06 DIAGNOSIS — G47.33 OSA (OBSTRUCTIVE SLEEP APNEA): ICD-10-CM

## 2024-06-06 DIAGNOSIS — Z82.49 FAMILY HISTORY OF EARLY CAD: ICD-10-CM

## 2024-06-06 DIAGNOSIS — E11.319 DIABETIC RETINOPATHY OF BOTH EYES WITHOUT MACULAR EDEMA ASSOCIATED WITH TYPE 2 DIABETES MELLITUS, UNSPECIFIED RETINOPATHY SEVERITY (HCC): ICD-10-CM

## 2024-06-06 PROCEDURE — 3008F BODY MASS INDEX DOCD: CPT | Performed by: FAMILY MEDICINE

## 2024-06-06 PROCEDURE — 3079F DIAST BP 80-89 MM HG: CPT | Performed by: FAMILY MEDICINE

## 2024-06-06 PROCEDURE — 3075F SYST BP GE 130 - 139MM HG: CPT | Performed by: FAMILY MEDICINE

## 2024-06-06 PROCEDURE — 3044F HG A1C LEVEL LT 7.0%: CPT | Performed by: FAMILY MEDICINE

## 2024-06-06 PROCEDURE — 99214 OFFICE O/P EST MOD 30 MIN: CPT | Performed by: FAMILY MEDICINE

## 2024-06-06 RX ORDER — INSULIN GLARGINE 100 [IU]/ML
INJECTION, SOLUTION SUBCUTANEOUS
COMMUNITY
End: 2024-06-06

## 2024-06-06 RX ORDER — SEMAGLUTIDE 2.68 MG/ML
2 INJECTION, SOLUTION SUBCUTANEOUS WEEKLY
COMMUNITY
Start: 2024-05-30

## 2024-06-06 RX ORDER — ASPIRIN 81 MG/1
TABLET ORAL
COMMUNITY

## 2024-08-23 DIAGNOSIS — E11.9 TYPE 2 DIABETES MELLITUS WITHOUT COMPLICATION, WITH LONG-TERM CURRENT USE OF INSULIN (HCC): ICD-10-CM

## 2024-08-23 DIAGNOSIS — Z79.4 TYPE 2 DIABETES MELLITUS WITHOUT COMPLICATION, WITH LONG-TERM CURRENT USE OF INSULIN (HCC): ICD-10-CM

## 2024-09-12 LAB
AMB EXT BILIRUBIN, TOTAL: 0.5 MG/DL
AMB EXT BUN: 17 MG/DL
AMB EXT CALCIUM: 9.3
AMB EXT CARBON DIOXIDE: 25
AMB EXT CHLORIDE: 105
AMB EXT CHOL/HDL RATIO: 2.9
AMB EXT CHOLESTEROL, TOTAL: 112 MG/DL
AMB EXT CMP ALT: 23 U/L
AMB EXT CMP AST: 17 U/L
AMB EXT CREATININE: 0 MG/DL
AMB EXT EGFR NON-AA: 102
AMB EXT EGFR-AA: 118
AMB EXT HDL CHOLESTEROL: 38 MG/DL
AMB EXT HGBA1C: 5.6 %
AMB EXT LDL CHOLESTEROL, DIRECT: 53 MG/DL
AMB EXT MALB URINE CALC: 1 MG/24HR
AMB EXT MALB/CRE CALC: 10 UG/MG
AMB EXT POSTASSIUM: 3.8 MMOL/L
AMB EXT SODIUM: 142 MMOL/L
AMB EXT TRIGLYCERIDES: 126 MG/DL

## 2024-10-06 DIAGNOSIS — I10 ESSENTIAL HYPERTENSION: ICD-10-CM

## 2024-10-07 RX ORDER — HYDROCHLOROTHIAZIDE 25 MG/1
25 TABLET ORAL
Qty: 90 TABLET | Refills: 0 | Status: SHIPPED | OUTPATIENT
Start: 2024-10-07

## 2024-11-15 DIAGNOSIS — I10 ESSENTIAL HYPERTENSION: ICD-10-CM

## 2024-11-15 DIAGNOSIS — E11.9 TYPE 2 DIABETES MELLITUS WITHOUT COMPLICATION, WITH LONG-TERM CURRENT USE OF INSULIN (HCC): ICD-10-CM

## 2024-11-15 DIAGNOSIS — Z79.4 TYPE 2 DIABETES MELLITUS WITHOUT COMPLICATION, WITH LONG-TERM CURRENT USE OF INSULIN (HCC): ICD-10-CM

## 2024-11-15 DIAGNOSIS — E78.00 ELEVATED CHOLESTEROL: ICD-10-CM

## 2024-11-16 RX ORDER — SIMVASTATIN 40 MG
40 TABLET ORAL NIGHTLY
Qty: 90 TABLET | Refills: 1 | Status: SHIPPED | OUTPATIENT
Start: 2024-11-16

## 2024-11-16 RX ORDER — ICOSAPENT ETHYL 1 G/1
2 CAPSULE ORAL 2 TIMES DAILY
Qty: 360 CAPSULE | Refills: 1 | Status: SHIPPED | OUTPATIENT
Start: 2024-11-16

## 2024-11-16 RX ORDER — LOSARTAN POTASSIUM 100 MG/1
100 TABLET ORAL DAILY
Qty: 90 TABLET | Refills: 1 | Status: SHIPPED | OUTPATIENT
Start: 2024-11-16

## 2024-12-16 ENCOUNTER — OFFICE VISIT (OUTPATIENT)
Dept: FAMILY MEDICINE CLINIC | Facility: CLINIC | Age: 54
End: 2024-12-16
Payer: COMMERCIAL

## 2024-12-16 ENCOUNTER — TELEPHONE (OUTPATIENT)
Facility: CLINIC | Age: 54
End: 2024-12-16

## 2024-12-16 VITALS
HEART RATE: 103 BPM | DIASTOLIC BLOOD PRESSURE: 64 MMHG | RESPIRATION RATE: 16 BRPM | SYSTOLIC BLOOD PRESSURE: 124 MMHG | OXYGEN SATURATION: 96 % | HEIGHT: 68 IN | BODY MASS INDEX: 42.13 KG/M2 | WEIGHT: 278 LBS

## 2024-12-16 DIAGNOSIS — G47.33 OSA (OBSTRUCTIVE SLEEP APNEA): ICD-10-CM

## 2024-12-16 DIAGNOSIS — Z00.00 ANNUAL PHYSICAL EXAM: Primary | ICD-10-CM

## 2024-12-16 NOTE — TELEPHONE ENCOUNTER
Patient wondering which physician of ours he saw about 15-20 years ago. He has been using a CPAP since then but may need a replacement and was instructed by his PCP to call our office with next steps.

## 2024-12-16 NOTE — PROGRESS NOTES
Sherwin Regalado Jr. is a 54 year old male who presents for a complete physical exam.   HPI:   Pt complains of nothing    mvi b12 vit D   No urinary symptoms  No erectile dysfunction  No penile discharge  c-scope never  Seeing [facility]o for DM at [facility]   [facility] - [facility]/ report needed / pt was told he had retinopathy   No family h/o colon or prostate cancer     Endo at [facility]   Cards at [facility]   Derm     Using cpap      Wt Readings from Last 6 Encounters:   12/16/24 278 lb (126.1 kg)   06/06/24 270 lb (122.5 kg)   11/30/23 272 lb 6.4 oz (123.6 kg)   07/05/23 277 lb (125.6 kg)   02/27/23 277 lb (125.6 kg)   06/20/22 275 lb (124.7 kg)     Body mass index is 42.27 kg/m².     Cholesterol, Total (mg/dL)   Date Value   09/12/2024 112   01/10/2022 122   01/14/2020 117     CHOLESTEROL, TOTAL (mg/dL)   Date Value   09/01/2023 116   09/16/2016 129   05/18/2016 139     HDL Cholesterol (mg/dL)   Date Value   09/12/2024 38   01/10/2022 37   01/14/2020 33     HDL CHOLESTEROL (mg/dL)   Date Value   09/01/2023 39 (L)   09/16/2016 32 (L)   05/18/2016 32 (L)     LDL-CHOLESTEROL (mg/dL (calc))   Date Value   09/01/2023 56   09/16/2016 43   05/18/2016 48     AST (U/L)   Date Value   09/12/2024 17   09/01/2023 16   09/16/2016 23   05/18/2016 21     ALT (U/L)   Date Value   09/12/2024 23   09/01/2023 20   09/16/2016 38   05/18/2016 33      Current Outpatient Medications   Medication Sig Dispense Refill    ICOSAPENT ETHYL 1 g Oral Cap TAKE 2 CAPSULES BY MOUTH TWICE A  capsule 1    LOSARTAN 100 MG Oral Tab TAKE 1 TABLET BY MOUTH EVERY DAY 90 tablet 1    SIMVASTATIN 40 MG Oral Tab TAKE 1 TABLET BY MOUTH EVERY DAY AT NIGHT 90 tablet 1    hydroCHLOROthiazide 25 MG Oral Tab TAKE 1 TABLET (25 MG TOTAL) BY MOUTH DAILY WITH FOOD. 90 tablet 0    METFORMIN HCL 1000 MG Oral Tab TAKE 1 TABLET BY MOUTH TWICE A DAY WITH FOOD 180 tablet 1    OZEMPIC, 2 MG/DOSE, 8 MG/3ML Subcutaneous Solution Pen-injector Inject 2 mg into the skin once a week.       aspirin (ASPIRIN ADULT LOW DOSE) 81 MG Oral Tab EC       Cholecalciferol (VITAMIN D3) 25 MCG (1000 UT) Oral Cap       insulin detemir (LEVEMIR) 100 UNIT/ML Subcutaneous Solution       semaglutide 2 MG/1.5ML Subcutaneous Solution Pen-injector       BD PEN NEEDLE RAUL 2ND GEN 32G X 4 MM Does not apply Misc 1 strip by In Vitro route daily.      Empagliflozin (JARDIANCE) 25 MG Oral Tab       cyanocobalamine 1000 MCG Oral Tab       Insulin Pen Needle (BD PEN NEEDLE MINI U/F) 31G X 5 MM Does not apply Misc Nightly insulin administration 100 each 1    Multiple Vitamin (MULTI-VITAMIN OR) Take  by mouth.        Past Medical History:    HIGH CHOLESTEROL    Lipid screening    Sleep apnea    Type II or unspecified type diabetes mellitus without mention of complication, not stated as uncontrolled    Unspecified essential hypertension      Past Surgical History:   Procedure Laterality Date    Oral surgery procedure        Family History   Problem Relation Age of Onset    Other (acute MI [Other]) Father     Other (CAD [Other]) Mother     Diabetes Father     Other (dyslipidemia [Other]) Brother     Other (dyslipidemia [Other]) Brother     Other (dyslipidemia [Other]) Sister     Hypertension Father     Hypertension Mother     Hypertension Brother     Hypertension Brother     Hypertension Sister       Social History:  Social History     Socioeconomic History    Marital status:    Tobacco Use    Smoking status: Never    Smokeless tobacco: Never   Substance and Sexual Activity    Alcohol use: Yes     Comment: rare/social    Drug use: No   Other Topics Concern    Caffeine Concern Yes     Comment: 2 qd    Exercise No     Social Drivers of Health      Received from [facility], [facility]    Social Connections    Received from [facility], [facility]    Housing Stability      Occ: . : . Children: 3  Working full time    Exercise: daily  Diet: low  sugar      REVIEW OF SYSTEMS:   GENERAL: feels well otherwise  SKIN: denies any unusual skin lesions  EYES:denies blurred vision or double vision  HEENT: denies nasal congestion, sinus pain or ST  LUNGS: denies shortness of breath with exertion  CARDIOVASCULAR: denies chest pain on exertion  GI: denies abdominal pain,denies heartburn  : denies nocturia or changes in stream  MUSCULOSKELETAL: denies back pain  NEURO: denies headaches  PSYCHE: denies depression or anxiety  HEMATOLOGIC: denies hx of anemia  ENDOCRINE: denies thyroid history  ALL/ASTHMA: denies asthma    EXAM:   /64   Pulse 103   Resp 16   Ht 5' 8\" (1.727 m)   Wt 278 lb (126.1 kg)   SpO2 96%   BMI 42.27 kg/m²   Body mass index is 42.27 kg/m².   GENERAL: well developed, well nourished,in no apparent distress  SKIN: no rashes,no suspicious lesions  HEENT: atraumatic, normocephalic,ears and throat are clear  EYES:PERRLA, EOMI, normal optic disk,conjunctiva are clear  NECK: supple,no adenopathy,no bruits, no JVD  CHEST: no chest tenderness  BREAST: no dominant or suspicious mass  LUNGS: clear to auscultation  CARDIO: RRR without murmur  GI: good BS's,no masses, HSM or tenderness  ((: two descended testes,no masses,no hernia,no penile lesions  RECTAL: good rectal tone, prostate uniform nild enlargement, stool is OB negative)) deferred   MUSCULOSKELETAL: back is not tender,FROM of the back  EXTREMITIES: no cyanosis, clubbing or edema  NEURO: Oriented times three,cranial nerves are intact,motor and sensory are grossly intact    ASSESSMENT AND PLAN:   Sherwin Regalado Jr. is a 54 year old male who presents for a complete physical exam.     1. Annual physical exam    - VITAMIN D, 25-HYDROXY [38533][Q]  - Free T4, (Free Thyroxine)  - Assay, Thyroid Stim Hormone  - Lipid Panel  - CBC With Differential With Platelet  - Vitamin B12  - Comp Metabolic Panel (14)  - Hemoglobin A1C  - PSA, TOTAL W REFLEX TO PSA, FREE [[id number]][Q]    2. ANGELA (obstructive  sleep apnea)    - Pulmonary Referral - In Network      Discussed diet, exercise, calcium, vit D and fish oil.    The patient indicates understanding of these issues and agrees to the plan.  The patient is asked to RTC in 6 mo or sooner if needed.

## 2024-12-30 DIAGNOSIS — I10 ESSENTIAL HYPERTENSION: ICD-10-CM

## 2024-12-31 RX ORDER — HYDROCHLOROTHIAZIDE 25 MG/1
25 TABLET ORAL
Qty: 90 TABLET | Refills: 0 | Status: SHIPPED | OUTPATIENT
Start: 2024-12-31

## 2025-01-06 LAB
ABSOLUTE BASOPHILS: 51 CELLS/UL (ref 0–200)
ABSOLUTE EOSINOPHILS: 143 CELLS/UL (ref 15–500)
ABSOLUTE LYMPHOCYTES: 2448 CELLS/UL (ref 850–3900)
ABSOLUTE MONOCYTES: 551 CELLS/UL (ref 200–950)
ABSOLUTE NEUTROPHILS: 7007 CELLS/UL (ref 1500–7800)
ALBUMIN/GLOBULIN RATIO: 1.7 (CALC) (ref 1–2.5)
ALBUMIN: 4.5 G/DL (ref 3.6–5.1)
ALKALINE PHOSPHATASE: 78 U/L (ref 35–144)
ALT: 27 U/L (ref 9–46)
AST: 19 U/L (ref 10–35)
BASOPHILS: 0.5 %
BILIRUBIN, TOTAL: 0.7 MG/DL (ref 0.2–1.2)
BUN: 16 MG/DL (ref 7–25)
CALCIUM: 9.1 MG/DL (ref 8.6–10.3)
CARBON DIOXIDE: 27 MMOL/L (ref 20–32)
CHLORIDE: 102 MMOL/L (ref 98–110)
CHOL/HDLC RATIO: 2.9 (CALC)
CHOLESTEROL, TOTAL: 120 MG/DL
CREATININE: 0.77 MG/DL (ref 0.7–1.3)
EGFR: 107 ML/MIN/1.73M2
EOSINOPHILS: 1.4 %
GLOBULIN: 2.7 G/DL (CALC) (ref 1.9–3.7)
GLUCOSE: 81 MG/DL (ref 65–99)
HDL CHOLESTEROL: 41 MG/DL
HEMATOCRIT: 51.3 % (ref 38.5–50)
HEMOGLOBIN A1C: 5.9 % OF TOTAL HGB
HEMOGLOBIN: 16.4 G/DL (ref 13.2–17.1)
LDL-CHOLESTEROL: 56 MG/DL (CALC)
LYMPHOCYTES: 24 %
MCH: 27.2 PG (ref 27–33)
MCHC: 32 G/DL (ref 32–36)
MCV: 84.9 FL (ref 80–100)
MONOCYTES: 5.4 %
MPV: 10.2 FL (ref 7.5–12.5)
NEUTROPHILS: 68.7 %
NON-HDL CHOLESTEROL: 79 MG/DL (CALC)
PLATELET COUNT: 285 THOUSAND/UL (ref 140–400)
POTASSIUM: 4 MMOL/L (ref 3.5–5.3)
PROTEIN, TOTAL: 7.2 G/DL (ref 6.1–8.1)
RDW: 13.1 % (ref 11–15)
RED BLOOD CELL COUNT: 6.04 MILLION/UL (ref 4.2–5.8)
SODIUM: 139 MMOL/L (ref 135–146)
T4, FREE: 1.2 NG/DL (ref 0.8–1.8)
TOTAL PSA: 2.4 NG/ML
TRIGLYCERIDES: 142 MG/DL
TSH: 2.66 MIU/L (ref 0.4–4.5)
VITAMIN B12: 363 PG/ML (ref 200–1100)
VITAMIN D, 25-OH, TOTAL: 45 NG/ML (ref 30–100)
WHITE BLOOD CELL COUNT: 10.2 THOUSAND/UL (ref 3.8–10.8)

## 2025-03-30 DIAGNOSIS — I10 ESSENTIAL HYPERTENSION: ICD-10-CM

## 2025-03-31 RX ORDER — HYDROCHLOROTHIAZIDE 25 MG/1
25 TABLET ORAL
Qty: 90 TABLET | Refills: 0 | Status: SHIPPED | OUTPATIENT
Start: 2025-03-31

## 2025-05-12 DIAGNOSIS — E78.00 ELEVATED CHOLESTEROL: ICD-10-CM

## 2025-05-12 RX ORDER — SIMVASTATIN 40 MG
40 TABLET ORAL NIGHTLY
Qty: 90 TABLET | Refills: 1 | Status: SHIPPED | OUTPATIENT
Start: 2025-05-12

## 2025-06-08 DIAGNOSIS — Z79.4 TYPE 2 DIABETES MELLITUS WITHOUT COMPLICATION, WITH LONG-TERM CURRENT USE OF INSULIN (HCC): ICD-10-CM

## 2025-06-08 DIAGNOSIS — E11.9 TYPE 2 DIABETES MELLITUS WITHOUT COMPLICATION, WITH LONG-TERM CURRENT USE OF INSULIN (HCC): ICD-10-CM

## 2025-06-08 DIAGNOSIS — E78.00 ELEVATED CHOLESTEROL: ICD-10-CM

## 2025-06-08 DIAGNOSIS — I10 ESSENTIAL HYPERTENSION: ICD-10-CM

## 2025-06-09 RX ORDER — ICOSAPENT ETHYL 1 G/1
2 CAPSULE ORAL 2 TIMES DAILY
Qty: 360 CAPSULE | Refills: 0 | Status: SHIPPED | OUTPATIENT
Start: 2025-06-09

## 2025-06-09 RX ORDER — LOSARTAN POTASSIUM 100 MG/1
100 TABLET ORAL DAILY
Qty: 90 TABLET | Refills: 0 | Status: SHIPPED | OUTPATIENT
Start: 2025-06-09

## 2025-06-09 NOTE — TELEPHONE ENCOUNTER
A refill request was received for:  Requested Prescriptions     Pending Prescriptions Disp Refills    LOSARTAN 100 MG Oral Tab [Pharmacy Med Name: LOSARTAN POTASSIUM 100 MG TAB] 90 tablet 1     Sig: TAKE 1 TABLET BY MOUTH EVERY DAY    ICOSAPENT ETHYL 1 g Oral Cap [Pharmacy Med Name: ICOSAPENT ETHYL 1 GRAM CAPSULE] 360 capsule 1     Sig: TAKE 2 CAPSULES BY MOUTH TWICE A DAY       Last refill date:     Losartan 100 11/16/24  Icosapent 11/16/24     Last office visit: 12/16/24 (Px)    Follow up due: Due for 6 month follow up~ 6/16/25. Not scheduled  No future appointments.

## 2025-08-24 DIAGNOSIS — E11.9 TYPE 2 DIABETES MELLITUS WITHOUT COMPLICATION, WITH LONG-TERM CURRENT USE OF INSULIN (HCC): ICD-10-CM

## 2025-08-24 DIAGNOSIS — Z79.4 TYPE 2 DIABETES MELLITUS WITHOUT COMPLICATION, WITH LONG-TERM CURRENT USE OF INSULIN (HCC): ICD-10-CM

## (undated) NOTE — Clinical Note
Dear Dr. Roxana Hutson,  Thank you for referring Angelica Walls to the [facility] in [place]. He was advised to follow up with you if not improving.   Sincerely,  Sima Carter NP